# Patient Record
Sex: MALE | Race: WHITE | NOT HISPANIC OR LATINO | Employment: FULL TIME | URBAN - METROPOLITAN AREA
[De-identification: names, ages, dates, MRNs, and addresses within clinical notes are randomized per-mention and may not be internally consistent; named-entity substitution may affect disease eponyms.]

---

## 2022-02-24 ENCOUNTER — OFFICE VISIT (OUTPATIENT)
Dept: URGENT CARE | Facility: MEDICAL CENTER | Age: 37
End: 2022-02-24
Payer: COMMERCIAL

## 2022-02-24 VITALS
TEMPERATURE: 98.2 F | BODY MASS INDEX: 25.77 KG/M2 | HEIGHT: 70 IN | SYSTOLIC BLOOD PRESSURE: 124 MMHG | WEIGHT: 180 LBS | HEART RATE: 90 BPM | OXYGEN SATURATION: 98 % | RESPIRATION RATE: 18 BRPM | DIASTOLIC BLOOD PRESSURE: 72 MMHG

## 2022-02-24 DIAGNOSIS — R19.7 DIARRHEA, UNSPECIFIED TYPE: ICD-10-CM

## 2022-02-24 DIAGNOSIS — R10.13 EPIGASTRIC PAIN: Primary | ICD-10-CM

## 2022-02-24 PROCEDURE — S9083 URGENT CARE CENTER GLOBAL: HCPCS | Performed by: PHYSICIAN ASSISTANT

## 2022-02-24 PROCEDURE — G0382 LEV 3 HOSP TYPE B ED VISIT: HCPCS | Performed by: PHYSICIAN ASSISTANT

## 2022-02-24 NOTE — PATIENT INSTRUCTIONS
1  Increase fluids  2  Tylenol as needed for pain  3  Roberts diet- progress diet slowly  4  Follow up with PCP in 3-5 days if symptoms persist   5  Proceed to  ER if symptoms worsen

## 2022-02-24 NOTE — PROGRESS NOTES
[facility]        NAME: Constance Skaggs is a 39 y o  male  : 1985    MRN: [id number]  DATE: 2022  TIME: 2:35 PM    Assessment and Plan   Epigastric pain [R10 13]  1  Epigastric pain     2  Diarrhea, unspecified type           Patient Instructions     1  Increase fluids  2  Tylenol as needed for pain  3  Mineral diet- progress diet slowly  4  Follow up with PCP in 3-5 days if symptoms persist   5  Proceed to  ER if symptoms worsen  Chief Complaint     Chief Complaint   Patient presents with    Abdominal Pain     patient states he ate pizza around 12:30 and had sharp shooting upper abdominal pain that has now subsided; associated with nausea and diarrhea          History of Present Illness       Sondra Royal is a 58-year-old male presents with a 1 day history of sharp abdominal pain followed by diarrhea  Patient reports he ate a slice of pizza for lunch and shortly thereafter had significant epigastric abdominal pain  Patient reports the pain was sharp and 10/10  He denies any nausea or vomiting but had 1 episode of diarrhea shortly after the onset of his symptoms  Patient reports symptoms improved, he reports his pain is significantly better, he has no prior history of dairy or lactose intolerance  Abdominal Pain  Associated symptoms include diarrhea  Pertinent negatives include no nausea or vomiting  Review of Systems   Review of Systems   Constitutional: Negative  HENT: Negative  Respiratory: Negative  Gastrointestinal: Positive for abdominal pain and diarrhea  Negative for nausea and vomiting  Current Medications     No current outpatient medications on file      Current Allergies     Allergies as of 2022    (No Known Allergies)            The following portions of the patient's history were reviewed and updated as appropriate: allergies, current medications, past family history, past medical history, past social history, past surgical history and problem list      History reviewed  No pertinent past medical history  History reviewed  No pertinent surgical history  History reviewed  No pertinent family history  Medications have been verified  Objective   /72 (BP Location: Left arm, Patient Position: Sitting)   Pulse 90   Temp 98 2 °F (36 8 °C)   Resp 18   Ht 5' 10" (1 778 m)   Wt 81 6 kg (180 lb)   SpO2 98%   BMI 25 83 kg/m²   No LMP for male patient  Physical Exam     Physical Exam  Constitutional:       General: He is not in acute distress  Appearance: He is well-developed  He is not ill-appearing  HENT:      Head: Normocephalic and atraumatic  Right Ear: Tympanic membrane and ear canal normal       Left Ear: Tympanic membrane and ear canal normal       Nose: Nose normal       Mouth/Throat:      Lips: Pink  Pharynx: Oropharynx is clear  Cardiovascular:      Rate and Rhythm: Normal rate and regular rhythm  Heart sounds: Normal heart sounds  No murmur heard  Pulmonary:      Effort: Pulmonary effort is normal       Breath sounds: Normal breath sounds  Abdominal:      General: Abdomen is flat  Bowel sounds are normal       Palpations: Abdomen is soft  Tenderness: There is abdominal tenderness in the epigastric area  There is no guarding or rebound  Comments: Slight epigastric tenderness to palpation, no guarding, rigidity or rebound  Neurological:      Mental Status: He is alert